# Patient Record
Sex: FEMALE | Race: WHITE | NOT HISPANIC OR LATINO | URBAN - METROPOLITAN AREA
[De-identification: names, ages, dates, MRNs, and addresses within clinical notes are randomized per-mention and may not be internally consistent; named-entity substitution may affect disease eponyms.]

---
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
## 2024-08-26 NOTE — ED PEDIATRIC NURSE NOTE - NSFALLRSKASSESSDT_ED_ALL_ED
Received request for prescription refills for patient.   Patient follows with Dr. Barron     Request is for Hydralazine 50mg BID  Is patient currently on medication yes    Last OV 7/18/24  Next OV 10/17/24    Pended for signing and sent to provider     16-Aug-2019 02:42

## 2024-09-10 NOTE — HISTORY OF PRESENT ILLNESS
[Yes] : Patient goes to dentist yearly N/A Patient is under age 18 and does not have a history of high risk behavior or is not high risk for Hep C [Up to date] : Up to date [Grade: ____] : Grade: [unfilled] [Eats regular meals including adequate fruits and vegetables] : eats regular meals including adequate fruits and vegetables [Has friends] : has friends [Eats meals with family] : eats meals with family [Uses safety belts/safety equipment] : uses safety belts/safety equipment  [Has peer relationships free of violence] : has peer relationships free of violence [No] : Patient has not had sexual intercourse. [Displays self-confidence] : displays self-confidence [With Teen] : teen [Uses electronic nicotine delivery system] : does not use electronic nicotine delivery system [Uses tobacco] : does not use tobacco [Uses drugs] : does not use drugs  [Drinks alcohol] : does not drink alcohol [Has problems with sleep] : does not have problems with sleep [Gets depressed, anxious, or irritable/has mood swings] : does not get depressed, anxious, or irritable/has mood swings [Has thought about hurting self or considered suicide] : has not thought about hurting self or considered suicide [de-identified] : mother via cellphone [de-identified] : no concern  [de-identified] : had braces  [de-identified] : lives with parents  [de-identified] : Krystal MACKEY  [de-identified] : swimming  [FreeTextEntry1] : Arron is a 14 years old female with asthma here for annual PE/ establish care. Accompanied by family friend, parents gave written permission and mother on cellphone during exam. Parents unable to attend due to house closing \par \par Since last PE, 2 years ago, pneumonia in 2016. Accompanied by family friend, parents gave written permission and mother on cellphone during exam. Parents unable to attend due to house closing. Recently moved to [place] from [place] due to father's job transfer\par \par Sports clearance for swimming \par Denies concussion, fracture, syncope, SOB\par Denies family history of cardiac death <51yo or during physical activity\par \par Never hospitalized for asthma. No asthma exacerbation \par PFT normal \par Asthma triggered by: seasonal allergies, illnesses\par Menarche: 11 years old \par LMP: 1 week ago, regular monthly, sometimes cramps but no heavy bleeding \par Denies sexual activity \par Denies any painful urination, vaginal discharge/odor or lesions/mass